# Patient Record
Sex: MALE | Race: WHITE | NOT HISPANIC OR LATINO | Employment: STUDENT | URBAN - METROPOLITAN AREA
[De-identification: names, ages, dates, MRNs, and addresses within clinical notes are randomized per-mention and may not be internally consistent; named-entity substitution may affect disease eponyms.]

---

## 2019-02-08 ENCOUNTER — TELEPHONE (OUTPATIENT)
Dept: UROLOGY | Facility: MEDICAL CENTER | Age: 19
End: 2019-02-08

## 2019-02-08 NOTE — TELEPHONE ENCOUNTER
Reason for appointment/Complaint/Diagnosis : nocturia and cyst above right testicle    Insurance: [facility]    History of Cancer? no                       If yes, what kind? none    Previous urologist?     no                  Records requested/where? Pediatric records at [facility]    Outside testing/where? [facility]     Location Preference for office visit? [place]    New patient paperwork sent via mail

## 2019-03-25 ENCOUNTER — OFFICE VISIT (OUTPATIENT)
Dept: UROLOGY | Facility: MEDICAL CENTER | Age: 19
End: 2019-03-25
Payer: COMMERCIAL

## 2019-03-25 VITALS
WEIGHT: 181 LBS | HEART RATE: 84 BPM | DIASTOLIC BLOOD PRESSURE: 76 MMHG | BODY MASS INDEX: 23.23 KG/M2 | HEIGHT: 74 IN | SYSTOLIC BLOOD PRESSURE: 168 MMHG

## 2019-03-25 DIAGNOSIS — N39.44 URINARY INCONTINENCE, NOCTURNAL ENURESIS: ICD-10-CM

## 2019-03-25 DIAGNOSIS — N43.40 SPERMATOCELE: ICD-10-CM

## 2019-03-25 DIAGNOSIS — I10 ESSENTIAL HYPERTENSION: ICD-10-CM

## 2019-03-25 DIAGNOSIS — N44.2 TESTICULAR CYST: Primary | ICD-10-CM

## 2019-03-25 LAB
SL AMB  POCT GLUCOSE, UA: NORMAL
SL AMB LEUKOCYTE ESTERASE,UA: NORMAL
SL AMB POCT BILIRUBIN,UA: NORMAL
SL AMB POCT BLOOD,UA: NORMAL
SL AMB POCT CLARITY,UA: CLEAR
SL AMB POCT COLOR,UA: NORMAL
SL AMB POCT KETONES,UA: NORMAL
SL AMB POCT NITRITE,UA: NORMAL
SL AMB POCT PH,UA: 7.5
SL AMB POCT SPECIFIC GRAVITY,UA: 1.01
SL AMB POCT URINE PROTEIN: NORMAL
SL AMB POCT UROBILINOGEN: 0.2

## 2019-03-25 PROCEDURE — 99204 OFFICE O/P NEW MOD 45 MIN: CPT | Performed by: UROLOGY

## 2019-03-25 PROCEDURE — 81003 URINALYSIS AUTO W/O SCOPE: CPT | Performed by: UROLOGY

## 2019-03-25 RX ORDER — IMIPRAMINE HCL 25 MG
25 TABLET ORAL
Qty: 30 TABLET | Refills: 11 | Status: SHIPPED | OUTPATIENT
Start: 2019-03-25

## 2019-03-25 NOTE — LETTER
2019     [facility], [address] [facility]  [address]    Patient: Florencio Tamez   YOB: 2000   Date of Visit: 3/25/2019       Dear Dr Rocky Lofton: Thank you for referring Argie Sever to me for evaluation  Below are my notes for this consultation  If you have questions, please do not hesitate to call me  I look forward to following your patient along with you  Sincerely,        Brooke Hameed MD        CC: No Recipients  Brooke Hameed MD  3/25/2019  3:12 PM  Incomplete  [address] [facility]  [address]  [address] [facility], [address] [place] [address]  [phone]  www  [facility]  org      NAME: Florencio Tamez  AGE: 19 y o  SEX: male  : 2000   MRN: [id number]    DATE: 3/25/2019  TIME: 1:51 PM    Assessment and Plan:    Nocturnal enuresis: This is been a lifelong problem, and should resolve eventually, and it does not occur every night  However we discussed the treatment such as DDAVP, imipramine or simply doing nothing  My recommendation if we are not going to simply observe, is to try imipramine 25 mg p o  Q h s   This will adjust his rapid eye movement sleep pattern and making make less urine at night  The side effects of excessive somnolence were explained  He wishes to start this and we will prescribe  Right spermatocele:  Given that this is a new finding, and he has essential hypertension, we will get a scrotal ultrasound as well as a renal ultrasound  I will see him back in 4-6 weeks to go over the results and see how he is doing with the imipramine  Chief Complaint   No chief complaint on file  History of Present Illness   New patient office visit:  History of nocturnal enuresis and cyst above right testicle    Was seen in  by Dr Meli Zhang   He has a history of some form of meatal stenosis that was treated surgically by Dr Javon Victor at that time by rosa plasty, which he says he did not note any beneficial effect in terms of nocturnal enuresis     During the day, he voids completely normally- at night he has nocturnal enuresis  He has a good stream   Sometimes this happens 3 nights in a row, and then he can go 2 weeks without any problems  This was the same issue he saw urology for in 2012  He has tried the alarm  He has never tried medication  Right testicular cyst:  Found incidentally on recent physical by Dr Faria Prost  No pain  The following portions of the patient's history were reviewed and updated as appropriate: allergies, current medications, past family history, past medical history, past social history, past surgical history and problem list     Review of Systems   Review of Systems    Active Problem List   There is no problem list on file for this patient  Objective   There were no vitals taken for this visit  Physical Exam   Constitutional: He is oriented to person, place, and time  He appears well-developed and well-nourished  HENT:   Head: Normocephalic and atraumatic  Eyes: EOM are normal    Neck: Normal range of motion  Pulmonary/Chest: Effort normal    Abdominal: Soft  He exhibits no distension and no mass  There is no tenderness  There is no rebound and no guarding  No hernia  Genitourinary: Penis normal    Genitourinary Comments: Soft spermatocele caput epididymis on the right, normal testicles otherwise  Normal circumcised phallus with signs of previous meatal plasty  No meatal stenosis  Musculoskeletal: Normal range of motion  Neurological: He is alert and oriented to person, place, and time  Skin: Skin is warm and dry  Psychiatric: He has a normal mood and affect  His behavior is normal  Judgment and thought content normal            Current Medications   No current outpatient medications on file          MD Elvin Ty MD  3/25/2019  1:52 PM  Sign at close encounter  [address] [facility] NOTE   [address]  for Urology  [facility]  [address] [facility]  [place], [address] [place] [address]  [phone]  www  [facility]  org      NAME: Gerard Sahni  AGE: 19 y o  SEX: male  : 2000   MRN: [id number]    DATE: 3/25/2019  TIME: 1:51 PM    Assessment and Plan               Chief Complaint   No chief complaint on file  History of Present Illness   New patient office visit:  History of nocturia and cyst above right testicle  Was seen in  by Dr Tyler Gandhi       The following portions of the patient's history were reviewed and updated as appropriate: allergies, current medications, past family history, past medical history, past social history, past surgical history and problem list     Review of Systems   Review of Systems    Active Problem List   There is no problem list on file for this patient  Objective   There were no vitals taken for this visit  Physical Exam        Current Medications   No current outpatient medications on file          Vin Covarrubias MD

## 2019-03-25 NOTE — PROGRESS NOTES
[address] [facility]  [facility]  [address] [facility]  [place], [address] [place] [address]  [phone]  www  [facility]  org      NAME: Estella Scruggs  AGE: 19 y o  SEX: male  : 2000   MRN: [id number]    DATE: 3/25/2019  TIME: 1:51 PM    Assessment and Plan:    Nocturnal enuresis: This is been a lifelong problem, and should resolve eventually, and it does not occur every night  However we discussed the treatment such as DDAVP, imipramine or simply doing nothing  My recommendation if we are not going to simply observe, is to try imipramine 25 mg p o  Q h s   This will adjust his rapid eye movement sleep pattern and making make less urine at night  The side effects of excessive somnolence were explained  He wishes to start this and we will prescribe  Right spermatocele:  Given that this is a new finding, and he has essential hypertension, we will get a scrotal ultrasound as well as a renal ultrasound  I will see him back in 4-6 weeks to go over the results and see how he is doing with the imipramine  Chief Complaint   No chief complaint on file  History of Present Illness   New patient office visit:  History of nocturnal enuresis and cyst above right testicle  Was seen in  by Dr Hardin Book   He has a history of some form of meatal stenosis that was treated surgically by Dr Gustavo Medina at that time by meatal plasty, which he says he did not note any beneficial effect in terms of nocturnal enuresis     During the day, he voids completely normally- at night he has nocturnal enuresis  He has a good stream   Sometimes this happens 3 nights in a row, and then he can go 2 weeks without any problems  This was the same issue he saw urology for in 2012  He has tried the alarm  He has never tried medication  Right testicular cyst:  Found incidentally on recent physical by Dr Marimar Veliz  No pain        The following portions of the patient's history were reviewed and updated as appropriate: allergies, current medications, past family history, past medical history, past social history, past surgical history and problem list     Review of Systems   Review of Systems    Active Problem List   There is no problem list on file for this patient  Objective   There were no vitals taken for this visit  Physical Exam   Constitutional: He is oriented to person, place, and time  He appears well-developed and well-nourished  HENT:   Head: Normocephalic and atraumatic  Eyes: EOM are normal    Neck: Normal range of motion  Pulmonary/Chest: Effort normal    Abdominal: Soft  He exhibits no distension and no mass  There is no tenderness  There is no rebound and no guarding  No hernia  Genitourinary: Penis normal    Genitourinary Comments: Soft spermatocele caput epididymis on the right, normal testicles otherwise  Normal circumcised phallus with signs of previous meatal plasty  No meatal stenosis  Musculoskeletal: Normal range of motion  Neurological: He is alert and oriented to person, place, and time  Skin: Skin is warm and dry  Psychiatric: He has a normal mood and affect  His behavior is normal  Judgment and thought content normal            Current Medications   No current outpatient medications on file          Isela Hairston MD

## 2019-03-29 ENCOUNTER — HOSPITAL ENCOUNTER (OUTPATIENT)
Dept: ULTRASOUND IMAGING | Facility: HOSPITAL | Age: 19
Discharge: HOME | End: 2019-03-29
Attending: UROLOGY
Payer: COMMERCIAL

## 2019-03-29 DIAGNOSIS — I10 ESSENTIAL HYPERTENSION: ICD-10-CM

## 2019-03-29 DIAGNOSIS — N43.40 SPERMATOCELE: ICD-10-CM

## 2019-03-29 DIAGNOSIS — N39.44 URINARY INCONTINENCE, NOCTURNAL ENURESIS: ICD-10-CM

## 2019-03-29 PROCEDURE — 76870 US EXAM SCROTUM: CPT

## 2019-03-29 PROCEDURE — 76770 US EXAM ABDO BACK WALL COMP: CPT

## 2019-04-03 ENCOUNTER — TELEPHONE (OUTPATIENT)
Dept: UROLOGY | Facility: MEDICAL CENTER | Age: 19
End: 2019-04-03

## 2019-05-06 ENCOUNTER — TELEPHONE (OUTPATIENT)
Dept: UROLOGY | Facility: CLINIC | Age: 19
End: 2019-05-06

## 2019-06-04 ENCOUNTER — OFFICE VISIT (OUTPATIENT)
Dept: UROLOGY | Facility: MEDICAL CENTER | Age: 19
End: 2019-06-04
Payer: COMMERCIAL

## 2019-06-04 VITALS
WEIGHT: 179 LBS | HEART RATE: 112 BPM | BODY MASS INDEX: 22.97 KG/M2 | DIASTOLIC BLOOD PRESSURE: 82 MMHG | SYSTOLIC BLOOD PRESSURE: 142 MMHG | HEIGHT: 74 IN

## 2019-06-04 DIAGNOSIS — N39.44 URINARY INCONTINENCE, NOCTURNAL ENURESIS: Primary | ICD-10-CM

## 2019-06-04 LAB
SL AMB  POCT GLUCOSE, UA: NEGATIVE
SL AMB LEUKOCYTE ESTERASE,UA: NEGATIVE
SL AMB POCT BILIRUBIN,UA: NEGATIVE
SL AMB POCT BLOOD,UA: NEGATIVE
SL AMB POCT CLARITY,UA: CLEAR
SL AMB POCT COLOR,UA: YELLOW
SL AMB POCT KETONES,UA: NEGATIVE
SL AMB POCT NITRITE,UA: NEGATIVE
SL AMB POCT PH,UA: 7
SL AMB POCT SPECIFIC GRAVITY,UA: 1.02
SL AMB POCT URINE PROTEIN: NEGATIVE
SL AMB POCT UROBILINOGEN: 1

## 2019-06-04 PROCEDURE — 81003 URINALYSIS AUTO W/O SCOPE: CPT | Performed by: UROLOGY

## 2019-06-04 PROCEDURE — 99213 OFFICE O/P EST LOW 20 MIN: CPT | Performed by: UROLOGY

## 2019-06-04 RX ORDER — IMIPRAMINE HCL 50 MG
25 TABLET ORAL
Qty: 30 TABLET | Refills: 11 | Status: SHIPPED | OUTPATIENT
Start: 2019-06-04

## 2019-10-08 ENCOUNTER — TELEPHONE (OUTPATIENT)
Dept: UROLOGY | Facility: MEDICAL CENTER | Age: 19
End: 2019-10-08

## 2019-10-08 DIAGNOSIS — N39.44 URINARY INCONTINENCE, NOCTURNAL ENURESIS: Primary | ICD-10-CM

## 2019-10-08 RX ORDER — IMIPRAMINE PAMOATE 75 MG
75 CAPSULE ORAL
Qty: 30 CAPSULE | Refills: 11 | Status: SHIPPED | OUTPATIENT
Start: 2019-10-08

## 2019-10-08 NOTE — TELEPHONE ENCOUNTER
Assessment and Plan:     Nocturnal enuresis:  Increase imipramine 50 mg p o  Q h s   This can even be increased higher if needed      Right spermatocele:  No treatment needed      Follow-up 6 months for reassessment  We will increase his dose if he calls on the phone per his request       Office note from 6/4/19  Pt's next scheduled appt is 12/3/19  Will direct to Dr Rayray Simpson

## 2019-10-08 NOTE — TELEPHONE ENCOUNTER
Patient of Dr Khalida Andersno seen in the [facility] office  Patient advised that he is taking imipramine 50 mg 1 time a day  Patient advised that it is not working for him and would like to know what to do next  Please advise

## 2019-12-18 ENCOUNTER — TELEPHONE (OUTPATIENT)
Dept: UROLOGY | Facility: MEDICAL CENTER | Age: 19
End: 2019-12-18

## 2019-12-18 NOTE — TELEPHONE ENCOUNTER
lvm for patient to call back to reschedule with PB or Lawrence Horn or Saundra Asif since PB left today sick

## 2020-11-19 ENCOUNTER — NURSE TRIAGE (OUTPATIENT)
Dept: OTHER | Facility: OTHER | Age: 20
End: 2020-11-19

## 2020-11-19 DIAGNOSIS — Z20.822 SUSPECTED COVID-19 VIRUS INFECTION: Primary | ICD-10-CM

## 2020-11-20 DIAGNOSIS — Z20.822 SUSPECTED COVID-19 VIRUS INFECTION: ICD-10-CM

## 2020-11-20 PROCEDURE — U0003 INFECTIOUS AGENT DETECTION BY NUCLEIC ACID (DNA OR RNA); SEVERE ACUTE RESPIRATORY SYNDROME CORONAVIRUS 2 (SARS-COV-2) (CORONAVIRUS DISEASE [COVID-19]), AMPLIFIED PROBE TECHNIQUE, MAKING USE OF HIGH THROUGHPUT TECHNOLOGIES AS DESCRIBED BY CMS-2020-01-R: HCPCS | Performed by: FAMILY MEDICINE

## 2020-11-22 LAB — SARS-COV-2 RNA SPEC QL NAA+PROBE: NOT DETECTED

## 2021-04-27 ENCOUNTER — IMMUNIZATIONS (OUTPATIENT)
Dept: FAMILY MEDICINE CLINIC | Facility: HOSPITAL | Age: 21
End: 2021-04-27

## 2021-04-27 DIAGNOSIS — Z23 ENCOUNTER FOR IMMUNIZATION: Primary | ICD-10-CM

## 2021-04-27 PROCEDURE — 91300 SARS-COV-2 / COVID-19 MRNA VACCINE (PFIZER-BIONTECH) 30 MCG: CPT

## 2021-04-27 PROCEDURE — 0001A SARS-COV-2 / COVID-19 MRNA VACCINE (PFIZER-BIONTECH) 30 MCG: CPT

## 2021-05-22 ENCOUNTER — IMMUNIZATIONS (OUTPATIENT)
Dept: FAMILY MEDICINE CLINIC | Facility: HOSPITAL | Age: 21
End: 2021-05-22

## 2021-05-22 DIAGNOSIS — Z23 ENCOUNTER FOR IMMUNIZATION: Primary | ICD-10-CM

## 2021-05-22 PROCEDURE — 0002A SARS-COV-2 / COVID-19 MRNA VACCINE (PFIZER-BIONTECH) 30 MCG: CPT

## 2021-05-22 PROCEDURE — 91300 SARS-COV-2 / COVID-19 MRNA VACCINE (PFIZER-BIONTECH) 30 MCG: CPT
